# Patient Record
Sex: FEMALE | Race: WHITE
[De-identification: names, ages, dates, MRNs, and addresses within clinical notes are randomized per-mention and may not be internally consistent; named-entity substitution may affect disease eponyms.]

---

## 2022-02-03 ENCOUNTER — HOSPITAL ENCOUNTER (OUTPATIENT)
Dept: HOSPITAL 46 - DS | Age: 47
Discharge: HOME | End: 2022-02-03
Attending: OBSTETRICS & GYNECOLOGY
Payer: COMMERCIAL

## 2022-02-03 VITALS — BODY MASS INDEX: 25.67 KG/M2 | WEIGHT: 183.38 LBS | HEIGHT: 71 IN

## 2022-02-03 DIAGNOSIS — E03.9: ICD-10-CM

## 2022-02-03 DIAGNOSIS — N83.8: ICD-10-CM

## 2022-02-03 DIAGNOSIS — G89.18: ICD-10-CM

## 2022-02-03 DIAGNOSIS — Z88.5: ICD-10-CM

## 2022-02-03 DIAGNOSIS — N93.9: ICD-10-CM

## 2022-02-03 DIAGNOSIS — D25.9: Primary | ICD-10-CM

## 2022-02-03 DIAGNOSIS — N80.0: ICD-10-CM

## 2022-02-03 PROCEDURE — 0UT94ZZ RESECTION OF UTERUS, PERCUTANEOUS ENDOSCOPIC APPROACH: ICD-10-PCS | Performed by: OBSTETRICS & GYNECOLOGY

## 2022-02-03 PROCEDURE — 0UT74ZZ RESECTION OF BILATERAL FALLOPIAN TUBES, PERCUTANEOUS ENDOSCOPIC APPROACH: ICD-10-PCS | Performed by: OBSTETRICS & GYNECOLOGY

## 2022-02-03 NOTE — OR
[facility]
                                    [address], [place]  [address]
_________________________________________________________________________________________
                                                                 Draft    
 
 
DATE OF OPERATION:
02/03/2022
 
SURGEON:
Hector Lemos DO
 
PROCEDURES:
Total laparoscopic hysterectomy, bilateral salpingectomy, cystoscopy.
 
PREOPERATIVE DIAGNOSIS:
Abnormal uterine bleeding, fibroid uterus.
 
POSTOPERATIVE DIAGNOSIS:
Abnormal uterine bleeding, fibroid uterus.
 
ESTIMATED BLOOD LOSS:
20 mL.
 
ANESTHESIA:
General.
 
COMPLICATIONS:
None.
 
FINDINGS:
Mildly enlarged uterus with 3 cm anterior right lower uterine segment fibroid,
normal-appearing bilateral ovaries, tubes, status post partial salpingectomy, otherwise
normal-appearing cornu and fimbriated ends.  Normal-appearing bladder. One endometriosis
lesion __________ on the anterior uterus, normal-appearing bilateral ovarian fossa, left
uterosacral ligament noted to be underdeveloped.  Bladder normal in appearance.  Dome
intact.  No evidence of injury.  Bilateral ureteral jets visualized. 
 
INDICATIONS:
The patient is a 47-year-old female with longstanding history of heavy and prolonged
menstrual bleeding, which has been directly interfering with her work as a [employer]
.  She strongly desired definitive surgical management and has declined
medical management of her heavy bleeding.  Hysteroscopy D and C confirmed benign
endometrial findings and she denies prior history of high-grade Pap with last Pap being
completed two years ago.  Risks, benefits, and alternatives to total laparoscopic
hysterectomy, bilateral salpingectomy, and cystoscopy were discussed and she elected to
proceed. 
 
 
                                                                                    
_________________________________________________________________________________________
PATIENT NAME:     TINO SERVIN              
MEDICAL RECORD #: [id number]            OPERATIVE REPORT              
          ACCT #: [id number]  
DATE OF BIRTH:   [birth year]            REPORT #: [id number]      
PHYSICIAN:        HECTOR LEMOS DO           
PCP:              GOLDY ROCA  PAC      
REPORT IS CONFIDENTIAL AND NOT TO BE RELEASED WITHOUT AUTHORIZATION
 
 
                                  [facility]
                                    [address], [place]  [address]
_________________________________________________________________________________________
                                                                 Draft    
 
 
DESCRIPTION OF PROCEDURE:
The patient was taken back to the operating room where she was given 2 g Ancef
preoperative prophylaxis as well as heparin.  She was placed under general anesthesia
and positioned in dorsal lithotomy, was prepped and draped in normal sterile fashion.  A
weighted speculum was placed in the vagina.  A Lucio catheter was placed and anterior
lip of the cervix was grasped with an Allis clamp.  Cervix was visually sequentially
dilated with Hegar dilators to accommodate medium-sized VCare manipulator and uterus was
sounded to 8 cm.  A VCare uterine manipulator was placed without difficulty.  All other
instrumentation was removed from the vagina.  Surgeon's gloves were changed and
attention was turned to the abdomen.  Local anesthetic was injected infraumbilically in
line with the patient's prior scar from postpartum tubal ligation and ovarian
cystectomy.  Incision was made with a scalpel and carried down to the underlying fascia
with blunt and sharp dissection with Metzenbaum scissors.  Fascia was grasped with
hemostats and elevated and incised with Metzenbaum scissors.  The superior and inferior
margins of the fascia were each tagged with a stay suture of 0 Vicryl.  The peritoneum
was entered bluntly as retractor was placed, allowing easy placement of the Belinda
trocar and abdomen was insufflated with CO2 gas.  Laparoscope was introduced confirming
intra-abdominal placement.  Local anesthetic was injected in the left lateral abdomen
and a 5 mm incision was made with a scalpel.  A 5 mm trocar was placed under direct
visualization without difficulty or complication.  In similar manner, local anesthetic
was injected in the right lateral abdomen through prior laparoscopy scar.  Incision was
made with a scalpel and 8 mm expanding trocar was placed under direct visualization
without difficulty or complication.  Blunt retractors and Trendelenburg positioning were
then used to facilitate survey of the pelvis with findings as noted above.  Left
fimbriated end of tube was grasped and elevated and mesosalpinx was cauterized and cut
with the LigaSure device to the cornua.  This was then with LigaSure device extending
along the mesosalpinx to the cornua where it was then transected and removed from the
abdomen under direct visualization.  In a similar manner, the right fimbriated end was
grasped, elevated, mesosalpinx, cauterized and cut parallel to the tubal remnants to the
level of the cornua and removed from the abdomen, also under direct visualization.  The
mesosalpinx was further developed inferiorly on the right to allow for full
cauterization of the right utero-ovarian ligament, which was noted to be very short.
Right round ligament was cauterized and cut with LigaSure device, then right
utero-ovarian ligament was cauterized 2nd time and also cut. This opened up the anterior
leaf of the broad ligament, which was dissected down to the level of cardinal ligaments
and brought across medially, creating a bladder flap, posteriorly leaf was then
developed to the level of the uterosacral ligaments and brought across medially.  The
uterine arteries were further dissected out, cauterized and cut using LigaSure device
along for further development of the bladder flap anteriorly with excellent hemostasis
noted.  Attention was then turned to the left side where in a very similar manner, the
left utero-ovarian ligament was cauterized and cut with LigaSure device, notably on this
side it was considerably longer and more developed than on the right. Round ligament was
 
                                                                                    
_________________________________________________________________________________________
PATIENT NAME:     TINO SERVIN              
MEDICAL RECORD #: [id number]            OPERATIVE REPORT              
          ACCT #: [id number]  
DATE OF BIRTH:   [birth year]            REPORT #: [id number]      
PHYSICIAN:        HECTOR LEMOS DO           
PCP:              GOLDY ROCA  PAC      
REPORT IS CONFIDENTIAL AND NOT TO BE RELEASED WITHOUT AUTHORIZATION
 
 
                                  [facility]
                                    [address], [place]  [address]
_________________________________________________________________________________________
                                                                 Draft    
 
 
then cauterized and cut also with ligature and broad ligament was entered along for
development of the anterior leaf of the broad ligament down to the level of the cardinal
ligaments at which point, dissection was brought across medially to meet the prior
dissection and bladder flap development as well as posteriorly to meet the prior
dissection at the level of the uterosacral ligaments. Uterine arteries were further
dissected and skeletonized and then cauterized and cut using the LigaSure device.  The
bladder flap was further developed and excellent hemostasis was noted.  Colpotomy was
performed using the harmonic Sonicision device with excellent visualization of the VCare
cup at all times.  Once free uterus and cervix were delivered vaginally as a single unit
and vaginal packing glove was placed.  Pelvis was suctioned and irrigated.  Excellent
hemostasis was noted and the vaginal cuff was closed with V-Loc suture using the Endo
Stitch device working from right to left and back medially, incorporating the right
uterosacral ligament well in the left uterosacral ligament, which was noted to be
underdeveloped as much as possible in the closure. Suture was cut.  The pelvis was again
suction irrigated with warm sterile saline with hemostasis once again noted.
Pneumoperitoneum was evacuated.  All trocars were removed.  The fascia was closed with 0
Vicryl in a running fashion.  Stay sutures were tied over top of the closure.  The skin
was closed with 4-0 Monocryl and attention was turned to the bladder.  The vaginal
packing was removed.  A Lucio catheter was removed.  Cystoscope was introduced. Bladder
was inspected with normal findings as noted above.  Dome was free of injury or puckering
and bilateral ureteral jets were visualized.  Cystoscope was removed.  Lucio catheter
was replaced.  Vaginal cuff was inspected visually in a gentle bimanual exam and was
noted to be intact and hemostatic.  All sponge and instrument counts were correct x2.
The patient was taken to recovery room in stable and satisfactory condition. 
 
 
 
            ________________________________________
            Hector Lemos DO 
 
 
EMDIMITRI/MODL
Job #:  [id number]
DD:  02/03/2022 11:36:57
DT:  02/03/2022 12:11:27
 
 
Copies:                                
~
 
 
 
 
                                                                                    
_________________________________________________________________________________________
PATIENT NAME:     TINO SERVIN              
MEDICAL RECORD #: [id number]            OPERATIVE REPORT              
          ACCT #: [id number]  
DATE OF BIRTH:   [birth year]            REPORT #: [id number]      
PHYSICIAN:        HECTOR LEMOS DO           
PCP:              GOLDY ROCA  PAC      
REPORT IS CONFIDENTIAL AND NOT TO BE RELEASED WITHOUT AUTHORIZATION

## 2022-02-04 NOTE — PATH
[facility]
                                    [address], [place]  [address]
_________________________________________________________________________________________
                                                                 Signed   
 
 
 
SPECIMEN(S): A UTERUS WITH FIBROIDS, CERVIX, AND BILAT TUBES
 
SPECIMEN SOURCE:
A. UTERUS WITH FIBROIDS, CERVIX, AND BILAT TUBES
 
CLINICAL HISTORY:
Abnormal uterine bleeding, uterine leiomyoma.  TLH, BS, cystoscopy.
 
FINAL PATHOLOGIC DIAGNOSIS:
Uterus, cervix, and bilateral fallopian tubes, hysterectomy and bilateral 
salpingectomy: 
-  Cervix:  No histopathologic abnormality.
-  Endometrium:  Proliferative endometrium.
-  Myometrium:  Leiomyomas (3.2 cm in greatest dimension).
-  Fallopian tubes:  Evidence of prior tubal ligation, paratubal cysts.
-  Fragmented portion of ovarian tissue adherent to uterine surface with no 
histopathologic abnormality. 
-  No evidence of malignancy.
NAL:cml:C2NR
 
MICROSCOPIC EXAMINATION:
Histologic sections of all submitted blocks are examined by light microscopy.  
These findings, together with the gross examination, support the pathologic 
diagnosis. 
 
GROSS DESCRIPTION:
The specimen, labeled "ANGELES, A," and designated on the requisition "bilateral 
tubes, cervix, uterus, fibroids," is received in formalin and consists of a 
uterus (140 gram, 7.5 cm cornu to cornu, 6.3 cm 
superior to inferior, 4.4 cm anterior to posterior), attached cervix (3.4 cm in 
length x 3.3 cm in diameter) with pink-tan to hemorrhagic cervical mucosa and 
patent slit-shaped os (1.0 x 0.5 cm), and 
two detached and undesignated fimbriated fallopian tube segments (5.5 cm in 
length x 0.5-1.0 cm in diameter and 2.0 cm in length x 0.8 cm in diameter).  
One fallopian tube segment is arbitrarily inked 
blue.  Both fallopian tube segments are red-brown, appears previously ligated, 
and are sectioned reveal pink-tan to hemorrhagic cut surfaces.  Both fallopian 
tubes have attached paratubal cysts that 
range in size from 0.2-1.0 cm in greatest dimension.  The fimbriae are entirely 
submitted. 
 
 
                                                                                    
_________________________________________________________________________________________
PATIENT NAME:     TINO SERVIN              
MEDICAL RECORD #: [id number]            PATHOLOGY                     
          ACCT #: [id number]       ACCESSION #: [id number]     
DATE OF BIRTH:   [birth year]            REPORT #: [id number]       
PHYSICIAN:        DAMIEN ESCALANTE              
PCP:              GOLDY ROCA  PAC      
REPORT IS CONFIDENTIAL AND NOT TO BE RELEASED WITHOUT AUTHORIZATION
 
 
                                  [facility]
                                    [address], [place]  [address]
_________________________________________________________________________________________
                                                                 Signed   
 
 
There is also a white-tan, fragmented piece of possible right ovary attached to 
the uterus (1.2 x 0.8 x 0.8 cm) that is serially sectioned to reveal a pink-tan 
cut surface. 
The uterine serosa is pink-tan and smooth.  The specimen is opened to reveal a 
pink-tan to hemorrhagic endocervix (endocervical canal: 2.3 cm in length x 1.6 
cm in diameter), and endometrial cavity 
(5.2 cm superior to inferior x 3.8 cm cornu to cornu) with hemorrhagic 
endometrial lining that measures up to 0.1 cm in thickness.  The myometrium is 
pink-tan and trabeculated with multiple intramural 
and submucosal white-tan, well-circumscribed nodules that range in size from 
0.3-3.2 cm in greatest dimension. 
Representative sections are submitted as follows:
(A1-A2)  Fallopian tubes
(A3)    Possible right ovary fragment, trisected
(A4)         Cervix
(A5)         Endomyometrium
(A6-A7)  Nodules
AC (under the direct supervision of a pathologist)
 
The Gross Description was prepared using a voice recognition system. The report 
was reviewed for accuracy; however, sound-alike word errors, addition and/or 
deletions may occur. If there is any 
question about this report, please contact Client Services.
 
PERFORMING LABORATORY:
The technical component was performed by [facility], [address] [place] [id number] (Medical Director: Mandi Chacko MD; CLIA# [id number]). 
[facility], [address] [place] [address] (CLIA# [id number]). 
 
Diagnostician:  Nelda Jacques MD
Pathologist
Electronically Signed 02/04/2022
 
 
Copies:                                
~
 
 
 
 
 
                                                                                    
_________________________________________________________________________________________
PATIENT NAME:     TINO SERVIN              
MEDICAL RECORD #: [id number]            PATHOLOGY                     
          ACCT #: [id number]       ACCESSION #: [id number]     
DATE OF BIRTH:   [birth year]            REPORT #: [id number]       
PHYSICIAN:        DAMIEN ESCALANTE              
PCP:              GOLDY ROCA  PAC      
REPORT IS CONFIDENTIAL AND NOT TO BE RELEASED WITHOUT AUTHORIZATION

## 2022-11-22 ENCOUNTER — HOSPITAL ENCOUNTER (EMERGENCY)
Dept: HOSPITAL 46 - ED | Age: 47
Discharge: HOME | End: 2022-11-22
Payer: COMMERCIAL

## 2022-11-22 VITALS — BODY MASS INDEX: 25.62 KG/M2 | WEIGHT: 183.01 LBS | HEIGHT: 71 IN

## 2022-11-22 DIAGNOSIS — S52.502A: Primary | ICD-10-CM
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
## 2022-11-22 DIAGNOSIS — Z79.899: ICD-10-CM

## 2022-11-22 NOTE — XMS
PreManage Notification: TINO SERVIN MRN:[id number]
 
Security Information
 
Security Events
No recent Security Events currently on file
 
 
 
CRITERIA MET
------------
- PDMP
 
 
CARE PROVIDERS
-------------------------------------------------------------------------------------
GOLDY ROCA      Physician Assistant     Current
 
PHONE: Unknown
-------------------------------------------------------------------------------------
ROSA SZYMANSKI      [facility]     Current
 
PHONE: Unknown
-------------------------------------------------------------------------------------
[facility]
 
PHONE: [phone]
-------------------------------------------------------------------------------------
MEDICINE, MAGALI      [facility]     Current
[facility]
 
PHONE: [phone]
-------------------------------------------------------------------------------------
 
[facility] has no Care Guidelines for this patient.
 
JOSH VISIT COUNT (12 MO.)
-------------------------------------------------------------------------------------
1 YANCI Priest
-------------------------------------------------------------------------------------
TOTAL 1
-------------------------------------------------------------------------------------
NOTE: Visits indicate total known visits.
 
ED/UCC VISIT TRACKING (12 MO.)
-------------------------------------------------------------------------------------
11/22/2022 09:44
YANCI Corbin [place]
 
TYPE: Emergency
 
COMPLAINT:
- POSS BROKEN L WRIST
-------------------------------------------------------------------------------------
 
 
INPATIENT VISIT TRACKING (12 MO.)
No inpatient visits to display in this time frame
 
 
[email or web address]